# Patient Record
Sex: FEMALE | Race: WHITE | Employment: UNEMPLOYED | URBAN - METROPOLITAN AREA
[De-identification: names, ages, dates, MRNs, and addresses within clinical notes are randomized per-mention and may not be internally consistent; named-entity substitution may affect disease eponyms.]

---

## 2017-04-18 ENCOUNTER — APPOINTMENT (OUTPATIENT)
Dept: GENERAL RADIOLOGY | Age: 27
End: 2017-04-18
Attending: NURSE PRACTITIONER
Payer: MEDICAID

## 2017-04-18 ENCOUNTER — HOSPITAL ENCOUNTER (OUTPATIENT)
Age: 27
Discharge: HOME | End: 2017-04-18
Payer: MEDICAID

## 2017-04-18 VITALS
RESPIRATION RATE: 18 BRPM | SYSTOLIC BLOOD PRESSURE: 132 MMHG | OXYGEN SATURATION: 100 % | DIASTOLIC BLOOD PRESSURE: 98 MMHG | HEART RATE: 89 BPM | TEMPERATURE: 98 F

## 2017-04-18 DIAGNOSIS — S69.91XA: Primary | ICD-10-CM

## 2017-04-18 PROCEDURE — 99213 OFFICE O/P EST LOW 20 MIN: CPT

## 2017-04-18 PROCEDURE — 73140 X-RAY EXAM OF FINGER(S): CPT

## 2017-04-18 PROCEDURE — 99203 OFFICE O/P NEW LOW 30 MIN: CPT

## 2017-04-18 PROCEDURE — 29130 APPL FINGER SPLINT STATIC: CPT

## 2017-04-18 NOTE — ED PROVIDER NOTES
Patient Seen in: [facility] In [facility]    History   Patient presents with:  Finger Pain    Stated Complaint: 3 WKS AGO LT THUMBNAIL INJURY     The history is provided by the patient.      60-year-old female who presents to the immediate car 04/18/17 1017 97.8 °F (36.6 °C)   Temp src 04/18/17 1017 Temporal   SpO2 04/18/17 1017 100 %   O2 Device 04/18/17 1017 None (Room air)       Current:/98 mmHg  Pulse 89  Temp(Src) 97.8 °F (36.6 °C) (Temporal)  Resp 18  SpO2 100%  LMP 03/31/2017 the diagnosis and need for followup with their primary care physician for further evaluation and care. Patient states they understand diagnosis, followup plan and agree with and understand  discharge instructions and plan.  I answered all of the patient's q

## 2017-04-18 NOTE — ED INITIAL ASSESSMENT (HPI)
Pt was closing a window 3 weeks ago, and she hit the window sill,  her nail from her thumbnail bed,  Now it is turning green and is beginning to get painful again

## (undated) NOTE — ED AVS SNAPSHOT
[facility] in [address] [place] [address] Drive,[address]    [address] [place] [address]    Phone:  [phone]    Fax:  [phone]           Lili Jeff   MRN: [id number]    Department:  [facility] in [facility]   Date of Visit:  4/18/2017 Click [email or web address]      Or call [phone]    If you have any problems with your follow-up, please call our  at [phone].     Si usted tiene algun problema con hampton sequimiento, por favor llame a nuestro adminstrador de casos al ([facility] Pharmacy Address Phone Number   [address] ([address]) [place] [address].  ([address] [facility]) [phone]   [facility] Dictated by: Vu Lamas MD on 4/18/2017 at 10:49       Approved by: Vu Lamas MD              Narrative:    PROCEDURE:  XR FINGER(S) (MIN 2 VIEWS), LEFT THUMB (CPT=73140)     INDICATIONS:  3 WKS AGO LT THUMBNAIL INJURY     COMPARISON:  None.